# Patient Record
Sex: MALE | Race: WHITE | NOT HISPANIC OR LATINO | Employment: FULL TIME | URBAN - METROPOLITAN AREA
[De-identification: names, ages, dates, MRNs, and addresses within clinical notes are randomized per-mention and may not be internally consistent; named-entity substitution may affect disease eponyms.]

---

## 2017-08-02 ENCOUNTER — CLINICAL SUPPORT (OUTPATIENT)
Dept: OCCUPATIONAL MEDICINE | Facility: CLINIC | Age: 27
End: 2017-08-02

## 2017-08-02 DIAGNOSIS — Z00.00 PHYSICAL EXAM: Primary | ICD-10-CM

## 2017-08-02 LAB
BILIRUB UR QL STRIP: NORMAL
GLUCOSE UR QL STRIP: NORMAL
KETONES UR QL STRIP: NORMAL
LEUKOCYTE ESTERASE UR QL STRIP: NORMAL
PH, POC UA: NORMAL (ref 5–8)
POC BLOOD, URINE: NORMAL
POC NITRATES, URINE: NORMAL
PROT UR QL STRIP: NORMAL
SP GR UR STRIP: NORMAL (ref 1–1.03)
UROBILINOGEN UR STRIP-ACNC: NORMAL (ref 0.3–2.2)

## 2017-08-02 PROCEDURE — 89240 UNLISTED MISC PATH TEST: CPT | Mod: S$GLB,,, | Performed by: PREVENTIVE MEDICINE

## 2017-08-02 PROCEDURE — 94010 BREATHING CAPACITY TEST: CPT | Mod: S$GLB,,, | Performed by: PREVENTIVE MEDICINE

## 2017-08-02 PROCEDURE — 99080 SPECIAL REPORTS OR FORMS: CPT | Mod: S$GLB,,, | Performed by: PREVENTIVE MEDICINE

## 2017-08-02 PROCEDURE — 99002 DEVICE HANDLING PHYS/QHP: CPT | Mod: S$GLB,,, | Performed by: PREVENTIVE MEDICINE

## 2017-08-02 PROCEDURE — 36415 COLL VENOUS BLD VENIPUNCTURE: CPT | Mod: S$GLB,,, | Performed by: PREVENTIVE MEDICINE

## 2017-08-02 PROCEDURE — 99000 SPECIMEN HANDLING OFFICE-LAB: CPT | Mod: S$GLB,,, | Performed by: PREVENTIVE MEDICINE

## 2023-10-12 ENCOUNTER — OFFICE VISIT (OUTPATIENT)
Dept: UROLOGY | Facility: CLINIC | Age: 33
End: 2023-10-12
Payer: COMMERCIAL

## 2023-10-12 VITALS
HEART RATE: 66 BPM | SYSTOLIC BLOOD PRESSURE: 137 MMHG | BODY MASS INDEX: 27.96 KG/M2 | WEIGHT: 195.31 LBS | HEIGHT: 70 IN | DIASTOLIC BLOOD PRESSURE: 86 MMHG

## 2023-10-12 DIAGNOSIS — R35.0 URINARY FREQUENCY: Primary | ICD-10-CM

## 2023-10-12 LAB
BILIRUB UR QL STRIP: NEGATIVE
CLARITY UR REFRACT.AUTO: CLEAR
COLOR UR AUTO: YELLOW
GLUCOSE UR QL STRIP: NEGATIVE
HGB UR QL STRIP: NEGATIVE
KETONES UR QL STRIP: NEGATIVE
LEUKOCYTE ESTERASE UR QL STRIP: NEGATIVE
NITRITE UR QL STRIP: NEGATIVE
PH UR STRIP: 5 [PH] (ref 5–8)
POC RESIDUAL URINE VOLUME: 2 ML (ref 0–100)
PROT UR QL STRIP: NEGATIVE
SP GR UR STRIP: 1.02 (ref 1–1.03)
URN SPEC COLLECT METH UR: NORMAL

## 2023-10-12 PROCEDURE — 1160F RVW MEDS BY RX/DR IN RCRD: CPT | Mod: CPTII,S$GLB,, | Performed by: NURSE PRACTITIONER

## 2023-10-12 PROCEDURE — 3079F PR MOST RECENT DIASTOLIC BLOOD PRESSURE 80-89 MM HG: ICD-10-PCS | Mod: CPTII,S$GLB,, | Performed by: NURSE PRACTITIONER

## 2023-10-12 PROCEDURE — 51798 POCT BLADDER SCAN: ICD-10-PCS | Mod: S$GLB,,, | Performed by: NURSE PRACTITIONER

## 2023-10-12 PROCEDURE — 1159F MED LIST DOCD IN RCRD: CPT | Mod: CPTII,S$GLB,, | Performed by: NURSE PRACTITIONER

## 2023-10-12 PROCEDURE — 1160F PR REVIEW ALL MEDS BY PRESCRIBER/CLIN PHARMACIST DOCUMENTED: ICD-10-PCS | Mod: CPTII,S$GLB,, | Performed by: NURSE PRACTITIONER

## 2023-10-12 PROCEDURE — 51798 US URINE CAPACITY MEASURE: CPT | Mod: S$GLB,,, | Performed by: NURSE PRACTITIONER

## 2023-10-12 PROCEDURE — 3008F PR BODY MASS INDEX (BMI) DOCUMENTED: ICD-10-PCS | Mod: CPTII,S$GLB,, | Performed by: NURSE PRACTITIONER

## 2023-10-12 PROCEDURE — 3008F BODY MASS INDEX DOCD: CPT | Mod: CPTII,S$GLB,, | Performed by: NURSE PRACTITIONER

## 2023-10-12 PROCEDURE — 99999 PR PBB SHADOW E&M-NEW PATIENT-LVL III: ICD-10-PCS | Mod: PBBFAC,,, | Performed by: NURSE PRACTITIONER

## 2023-10-12 PROCEDURE — 99204 OFFICE O/P NEW MOD 45 MIN: CPT | Mod: S$GLB,,, | Performed by: NURSE PRACTITIONER

## 2023-10-12 PROCEDURE — 81003 URINALYSIS AUTO W/O SCOPE: CPT | Performed by: NURSE PRACTITIONER

## 2023-10-12 PROCEDURE — 3075F SYST BP GE 130 - 139MM HG: CPT | Mod: CPTII,S$GLB,, | Performed by: NURSE PRACTITIONER

## 2023-10-12 PROCEDURE — 87086 URINE CULTURE/COLONY COUNT: CPT | Performed by: NURSE PRACTITIONER

## 2023-10-12 PROCEDURE — 99204 PR OFFICE/OUTPT VISIT, NEW, LEVL IV, 45-59 MIN: ICD-10-PCS | Mod: S$GLB,,, | Performed by: NURSE PRACTITIONER

## 2023-10-12 PROCEDURE — 99999 PR PBB SHADOW E&M-NEW PATIENT-LVL III: CPT | Mod: PBBFAC,,, | Performed by: NURSE PRACTITIONER

## 2023-10-12 PROCEDURE — 3079F DIAST BP 80-89 MM HG: CPT | Mod: CPTII,S$GLB,, | Performed by: NURSE PRACTITIONER

## 2023-10-12 PROCEDURE — 3075F PR MOST RECENT SYSTOLIC BLOOD PRESS GE 130-139MM HG: ICD-10-PCS | Mod: CPTII,S$GLB,, | Performed by: NURSE PRACTITIONER

## 2023-10-12 PROCEDURE — 1159F PR MEDICATION LIST DOCUMENTED IN MEDICAL RECORD: ICD-10-PCS | Mod: CPTII,S$GLB,, | Performed by: NURSE PRACTITIONER

## 2023-10-12 NOTE — PROGRESS NOTES
Subjective:       Patient ID: Sonido Morillo is a 33 y.o. male.    Chief Complaint: Urinary Frequency    Patient is new to me. He is a 32 yo WM who is here today with c/o urinary frequency. He denies any new medications or drinking alcohol. He reports drinking coffee every morning, but that is not new.     Urinary Frequency   This is a new problem. Episode onset: 3 days ago. The problem has been gradually improving. The pain is at a severity of 0/10. The patient is experiencing no pain. There has been no fever. He is Sexually active. There is No history of pyelonephritis. Associated symptoms include frequency. Pertinent negatives include no behavior changes, chills, discharge, flank pain, hematuria, hesitancy, nausea, sweats, urgency, vomiting, weight loss, bubble bath use, constipation, rash or withholding. He has tried nothing for the symptoms. There is no history of diabetes mellitus, hypertension, kidney stones, recurrent UTIs, a single kidney or a urological procedure.     Review of Systems   Constitutional:  Negative for chills, fatigue, fever and weight loss.   Gastrointestinal:  Negative for abdominal pain, change in bowel habit, constipation, diarrhea, nausea and vomiting.   Genitourinary:  Positive for frequency. Negative for decreased urine volume, difficulty urinating, discharge, dysuria, flank pain, genital sores, hematuria, hesitancy, penile pain, penile swelling, scrotal swelling, testicular pain and urgency.   Integumentary:  Negative for rash.   Neurological:  Negative for dizziness, weakness and headaches.   Psychiatric/Behavioral: Negative.           Objective:      Physical Exam  Vitals and nursing note reviewed.   Constitutional:       General: He is not in acute distress.     Appearance: He is well-developed. He is not ill-appearing.   HENT:      Head: Normocephalic and atraumatic.   Eyes:      Pupils: Pupils are equal, round, and reactive to light.   Cardiovascular:      Rate and Rhythm: Normal  rate.   Pulmonary:      Effort: Pulmonary effort is normal. No respiratory distress.   Abdominal:      Palpations: Abdomen is soft.      Tenderness: There is no abdominal tenderness.   Genitourinary:     Comments: PVR = 2 mL  Musculoskeletal:         General: Normal range of motion.      Cervical back: Normal range of motion.   Skin:     General: Skin is warm and dry.   Neurological:      Mental Status: He is alert and oriented to person, place, and time.      Coordination: Coordination normal.   Psychiatric:         Mood and Affect: Mood normal.         Behavior: Behavior normal.         Thought Content: Thought content normal.         Judgment: Judgment normal.         Assessment:       Problem List Items Addressed This Visit    None  Visit Diagnoses       Urinary frequency    -  Primary    Relevant Orders    Urine culture    Urinalysis    POCT Bladder Scan            Plan:           Sonido was seen today for urinary frequency.    Diagnoses and all orders for this visit:    Urinary frequency  -     Urine culture  -     Urinalysis  -     POCT Bladder Scan    Follow-up pending urine cx results.    Jacy Miranda NP

## 2023-10-13 LAB — BACTERIA UR CULT: NO GROWTH

## 2023-10-17 ENCOUNTER — TELEPHONE (OUTPATIENT)
Dept: UROLOGY | Facility: CLINIC | Age: 33
End: 2023-10-17
Payer: COMMERCIAL

## 2023-10-17 DIAGNOSIS — R35.0 URINARY FREQUENCY: Primary | ICD-10-CM

## 2023-10-17 RX ORDER — SOLIFENACIN SUCCINATE 5 MG/1
5 TABLET, FILM COATED ORAL DAILY
Qty: 30 TABLET | Refills: 11 | Status: SHIPPED | OUTPATIENT
Start: 2023-10-17 | End: 2024-10-16

## 2023-10-17 NOTE — TELEPHONE ENCOUNTER
----- Message from Jacy Miranda NP sent at 10/17/2023  1:11 PM CDT -----  Regarding: RE: update  Solifenacin (OAB med) sent to [facility]. Follow-up in 4 weeks via telephone encounter.   ----- Message -----  From: Carolin Hansen MA  Sent: 10/17/2023   1:09 PM CDT  To: Jacy Miranda NP  Subject: update                                           Pt says he would like to start a trial of OAB    Please inform patient via telephone that his urine cx was normal. He does not have a UTI. If patient is still experiencing urinary frequency, I can start him on a trial of OAB medication. Find out how patient is doing and send me an update. Thanks.

## 2023-10-17 NOTE — TELEPHONE ENCOUNTER
----- Message from Jacy Miranda NP sent at 10/16/2023  9:37 PM CDT -----  Please inform patient via telephone that his urine cx was normal. He does not have a UTI. If patient is still experiencing urinary frequency, I can start him on a trial of OAB medication. Find out how patient is doing and send me an update. Thanks.

## 2023-10-17 NOTE — TELEPHONE ENCOUNTER
Called patient to give results and ask questions, no answer, left message for patient to call clinic back

## 2023-10-17 NOTE — PROGRESS NOTES
Diagnoses and all orders for this visit:    Urinary frequency  -     solifenacin (VESICARE) 5 MG tablet; Take 1 tablet (5 mg total) by mouth once daily.    Follow-up in 4 weeks via telephone encounter.    Jacy Miranda NP

## 2023-11-14 ENCOUNTER — TELEPHONE (OUTPATIENT)
Dept: UROLOGY | Facility: CLINIC | Age: 33
End: 2023-11-14
Payer: COMMERCIAL

## 2023-11-14 NOTE — TELEPHONE ENCOUNTER
Follow-up telephone call placed to patient for urinary frequency. Patient reports he forgot about appt today and is actually a little tied up at work at this time. He will call back when his schedule permits.     Jacy Miranda NP